# Patient Record
Sex: MALE | Race: WHITE | URBAN - METROPOLITAN AREA
[De-identification: names, ages, dates, MRNs, and addresses within clinical notes are randomized per-mention and may not be internally consistent; named-entity substitution may affect disease eponyms.]

---

## 2020-10-02 ENCOUNTER — RECORDS - HEALTHEAST (OUTPATIENT)
Dept: LAB | Facility: CLINIC | Age: 20
End: 2020-10-02

## 2020-10-06 LAB
C TRACH DNA SPEC QL PROBE+SIG AMP: NEGATIVE
N GONORRHOEA DNA SPEC QL NAA+PROBE: NEGATIVE

## 2021-05-26 ENCOUNTER — COMMUNICATION - HEALTHEAST (OUTPATIENT)
Dept: SCHEDULING | Facility: CLINIC | Age: 21
End: 2021-05-26

## 2021-06-25 NOTE — TELEPHONE ENCOUNTER
Deer tick was on him for about 20 hours and wants to be seen.  Warm transferred to scheduling for appointment or Walk In Clinic today.      Lucia Montilla RN [facility] Triage Nurse Advisor      Reason for Disposition    Red streak or red line and length > 2 inches (5 cm)    Additional Information    Negative: Not a tick bite    Negative: Patient sounds very sick or weak to the triager    Negative: Fever or severe headache occurs, 2 to 14 days following the bite    Negative: Widespread rash occurs, 2 to 14 days following the bite    Negative: Can't remove live tick (after using Care Advice)    Negative: Can't remove tick's head that was broken off in the skin (after using Care Advice)    Negative: Fever and area is red    Negative: Fever and area is very tender to touch    Protocols used: TICK BITE-A-OH